# Patient Record
Sex: MALE | Race: WHITE | Employment: OTHER | URBAN - METROPOLITAN AREA
[De-identification: names, ages, dates, MRNs, and addresses within clinical notes are randomized per-mention and may not be internally consistent; named-entity substitution may affect disease eponyms.]

---

## 2017-01-18 PROCEDURE — 82043 UR ALBUMIN QUANTITATIVE: CPT | Performed by: INTERNAL MEDICINE

## 2017-01-18 PROCEDURE — 82570 ASSAY OF URINE CREATININE: CPT | Performed by: INTERNAL MEDICINE

## 2017-04-11 ENCOUNTER — TELEPHONE (OUTPATIENT)
Dept: NEPHROLOGY | Facility: CLINIC | Age: 82
End: 2017-04-11

## 2017-04-12 NOTE — TELEPHONE ENCOUNTER
Left message for pt and and message for daughter about recent labs- stable Cr 3.2 + hgb 10.7- to continue checking labs q3 months- mulugeta bates

## 2017-06-27 RX ORDER — SODIUM BICARBONATE 650 MG/1
650 TABLET ORAL DAILY
Qty: 30 TABLET | Refills: 0 | Status: SHIPPED | OUTPATIENT
Start: 2017-06-27 | End: 2017-08-18

## 2017-08-17 RX ORDER — SODIUM BICARBONATE 650 MG/1
650 TABLET ORAL DAILY
OUTPATIENT
Start: 2017-08-17

## 2017-08-18 RX ORDER — SODIUM BICARBONATE 650 MG/1
650 TABLET ORAL DAILY
Qty: 30 TABLET | Refills: 0 | Status: SHIPPED | OUTPATIENT
Start: 2017-08-18 | End: 2017-09-19

## 2017-08-22 PROBLEM — L60.9 DISEASE OF NAIL: Status: ACTIVE | Noted: 2017-08-22

## 2017-08-28 RX ORDER — FUROSEMIDE 20 MG/1
20 TABLET ORAL DAILY
Qty: 30 TABLET | Refills: 0 | Status: SHIPPED | OUTPATIENT
Start: 2017-08-28 | End: 2017-10-05

## 2017-09-14 ENCOUNTER — OFFICE VISIT (OUTPATIENT)
Dept: NEPHROLOGY | Facility: CLINIC | Age: 82
End: 2017-09-14

## 2017-09-14 VITALS — DIASTOLIC BLOOD PRESSURE: 74 MMHG | BODY MASS INDEX: 29 KG/M2 | SYSTOLIC BLOOD PRESSURE: 122 MMHG | WEIGHT: 193 LBS

## 2017-09-14 DIAGNOSIS — N18.4 CKD (CHRONIC KIDNEY DISEASE) STAGE 4, GFR 15-29 ML/MIN (HCC): Primary | ICD-10-CM

## 2017-09-14 DIAGNOSIS — E87.2 METABOLIC ACIDOSIS: ICD-10-CM

## 2017-09-14 DIAGNOSIS — N18.4 ANEMIA IN STAGE 4 CHRONIC KIDNEY DISEASE (HCC): ICD-10-CM

## 2017-09-14 DIAGNOSIS — D63.1 ANEMIA IN STAGE 4 CHRONIC KIDNEY DISEASE (HCC): ICD-10-CM

## 2017-09-14 PROCEDURE — 99215 OFFICE O/P EST HI 40 MIN: CPT | Performed by: INTERNAL MEDICINE

## 2017-09-14 NOTE — PROGRESS NOTES
Nephrology Progress Note      ASSESSMENT/PLAN:         1) CKD 4- relatively stable renal dysfunction since last visit 6/16 with baseline Cr 3-3.5 mg/dl is likely due to a combination of long-standing HTN / DM + age-related nephrosclerosis; there may be an compared to previously. Has denies any recent hospitalizations or other problems. Meds are otherwise unchanged.     HISTORY:  Past Medical History:   Diagnosis Date   • ANEMIA    • Back problem    • Blind    • DIABETES    • Diabetes ([facility] [address]    • Family hx o Social History: Smoking status: Former Smoker                                                              Packs/day: 0.50      Years: 15.00        Types: Cigarettes     Quit date: 1/1/1970  Smokeless tobacco: Never Used                      Alcohol use: N/V/D  Denies change in urinary habits or gross hematuria  + edema  Denies skin rashes/myalgias/arthralgias      PHYSICAL EXAM:   /74   Wt 193 lb   BMI 29.35 kg/m²   Wt Readings from Last 3 Encounters:  09/14/17 : 193 lb  08/22/17 : 196 lb  07/18/17

## 2017-09-19 RX ORDER — SODIUM BICARBONATE 650 MG/1
650 TABLET ORAL DAILY
Qty: 90 TABLET | Refills: 1 | Status: SHIPPED | OUTPATIENT
Start: 2017-09-19 | End: 2017-11-07

## 2017-09-19 RX ORDER — SODIUM BICARBONATE 650 MG/1
650 TABLET ORAL DAILY
Qty: 90 TABLET | Refills: 1 | Status: SHIPPED | OUTPATIENT
Start: 2017-09-19 | End: 2018-03-08

## 2017-10-05 RX ORDER — FUROSEMIDE 20 MG/1
20 TABLET ORAL DAILY
Qty: 30 TABLET | Refills: 5 | Status: SHIPPED | OUTPATIENT
Start: 2017-10-05 | End: 2018-04-29

## 2017-10-25 PROCEDURE — 82043 UR ALBUMIN QUANTITATIVE: CPT | Performed by: INTERNAL MEDICINE

## 2017-10-25 PROCEDURE — 36415 COLL VENOUS BLD VENIPUNCTURE: CPT | Performed by: INTERNAL MEDICINE

## 2017-10-25 PROCEDURE — 82570 ASSAY OF URINE CREATININE: CPT | Performed by: INTERNAL MEDICINE

## 2018-01-01 ENCOUNTER — HOSPITAL ENCOUNTER (INPATIENT)
Facility: HOSPITAL | Age: 83
LOS: 1 days | Discharge: HOME | DRG: 699 | End: 2018-01-01
Attending: INTERNAL MEDICINE | Admitting: INTERNAL MEDICINE
Payer: MEDICARE

## 2018-01-01 ENCOUNTER — NURSE ONLY (OUTPATIENT)
Dept: NEPHROLOGY | Facility: CLINIC | Age: 83
End: 2018-01-01
Payer: MEDICARE

## 2018-01-01 ENCOUNTER — TELEPHONE (OUTPATIENT)
Dept: NEPHROLOGY | Facility: CLINIC | Age: 83
End: 2018-01-01

## 2018-01-01 ENCOUNTER — APPOINTMENT (OUTPATIENT)
Dept: ULTRASOUND IMAGING | Facility: HOSPITAL | Age: 83
DRG: 699 | End: 2018-01-01
Attending: INTERNAL MEDICINE
Payer: MEDICARE

## 2018-01-01 VITALS — WEIGHT: 202 LBS | SYSTOLIC BLOOD PRESSURE: 148 MMHG | BODY MASS INDEX: 33 KG/M2 | DIASTOLIC BLOOD PRESSURE: 74 MMHG

## 2018-01-01 VITALS
HEART RATE: 87 BPM | WEIGHT: 188.38 LBS | OXYGEN SATURATION: 98 % | DIASTOLIC BLOOD PRESSURE: 67 MMHG | SYSTOLIC BLOOD PRESSURE: 141 MMHG | RESPIRATION RATE: 18 BRPM | TEMPERATURE: 98 F | BODY MASS INDEX: 30 KG/M2

## 2018-01-01 DIAGNOSIS — N18.5 ANEMIA DUE TO STAGE 5 CHRONIC KIDNEY DISEASE, NOT ON CHRONIC DIALYSIS (HCC): ICD-10-CM

## 2018-01-01 DIAGNOSIS — N18.5 CKD (CHRONIC KIDNEY DISEASE) STAGE 5, GFR LESS THAN 15 ML/MIN (HCC): ICD-10-CM

## 2018-01-01 DIAGNOSIS — D63.1 ANEMIA DUE TO STAGE 5 CHRONIC KIDNEY DISEASE, NOT ON CHRONIC DIALYSIS (HCC): ICD-10-CM

## 2018-01-01 PROCEDURE — 96372 THER/PROPH/DIAG INJ SC/IM: CPT | Performed by: INTERNAL MEDICINE

## 2018-01-01 PROCEDURE — 99223 1ST HOSP IP/OBS HIGH 75: CPT | Performed by: INTERNAL MEDICINE

## 2018-01-01 PROCEDURE — 76770 US EXAM ABDO BACK WALL COMP: CPT | Performed by: INTERNAL MEDICINE

## 2018-01-01 PROCEDURE — 99233 SBSQ HOSP IP/OBS HIGH 50: CPT | Performed by: INTERNAL MEDICINE

## 2018-01-01 PROCEDURE — 85025 COMPLETE CBC W/AUTO DIFF WBC: CPT | Performed by: INTERNAL MEDICINE

## 2018-01-01 PROCEDURE — 80048 BASIC METABOLIC PNL TOTAL CA: CPT | Performed by: INTERNAL MEDICINE

## 2018-01-01 RX ORDER — SODIUM BICARBONATE 650 MG/1
650 TABLET ORAL 2 TIMES DAILY
Qty: 60 TABLET | Refills: 11 | Status: ON HOLD | OUTPATIENT
Start: 2018-01-01 | End: 2019-01-01

## 2018-01-01 RX ORDER — GLIMEPIRIDE 2 MG/1
2 TABLET ORAL
Status: DISCONTINUED | OUTPATIENT
Start: 2018-01-01 | End: 2018-01-01

## 2018-01-01 RX ORDER — FUROSEMIDE 20 MG/1
20 TABLET ORAL DAILY
Status: DISCONTINUED | OUTPATIENT
Start: 2018-01-01 | End: 2018-01-01

## 2018-01-01 RX ORDER — FUROSEMIDE 20 MG/1
20 TABLET ORAL DAILY
Qty: 30 TABLET | Refills: 5 | Status: SHIPPED | OUTPATIENT
Start: 2018-01-01 | End: 2019-01-01

## 2018-01-01 RX ORDER — SODIUM BICARBONATE 325 MG/1
650 TABLET ORAL DAILY
Status: DISCONTINUED | OUTPATIENT
Start: 2018-01-01 | End: 2018-01-01

## 2018-01-01 RX ORDER — ALFUZOSIN HYDROCHLORIDE 10 MG/1
10 TABLET, EXTENDED RELEASE ORAL
Status: DISCONTINUED | OUTPATIENT
Start: 2018-01-01 | End: 2018-01-01

## 2018-01-01 RX ORDER — FUROSEMIDE 20 MG/1
20 TABLET ORAL DAILY
Qty: 30 TABLET | Refills: 4 | OUTPATIENT
Start: 2018-01-01

## 2018-01-01 RX ORDER — SODIUM BICARBONATE 325 MG/1
650 TABLET ORAL 2 TIMES DAILY
Status: DISCONTINUED | OUTPATIENT
Start: 2018-01-01 | End: 2018-01-01

## 2018-02-27 PROBLEM — L60.9 DISEASE OF NAIL: Status: ACTIVE | Noted: 2018-02-27

## 2018-03-12 RX ORDER — SODIUM BICARBONATE 650 MG/1
650 TABLET ORAL DAILY
Qty: 90 TABLET | Refills: 1 | Status: SHIPPED | OUTPATIENT
Start: 2018-03-12 | End: 2018-08-23

## 2018-04-30 RX ORDER — FUROSEMIDE 20 MG/1
20 TABLET ORAL DAILY
Qty: 30 TABLET | Refills: 4 | Status: SHIPPED | OUTPATIENT
Start: 2018-04-30 | End: 2018-01-01

## 2018-05-01 ENCOUNTER — TELEPHONE (OUTPATIENT)
Dept: NEPHROLOGY | Facility: CLINIC | Age: 83
End: 2018-05-01

## 2018-08-27 RX ORDER — SODIUM BICARBONATE 650 MG/1
650 TABLET ORAL DAILY
Qty: 30 TABLET | Refills: 0 | Status: SHIPPED | OUTPATIENT
Start: 2018-08-27 | End: 2018-09-27

## 2018-09-12 PROCEDURE — 82570 ASSAY OF URINE CREATININE: CPT | Performed by: INTERNAL MEDICINE

## 2018-09-12 PROCEDURE — 82043 UR ALBUMIN QUANTITATIVE: CPT | Performed by: INTERNAL MEDICINE

## 2018-09-17 ENCOUNTER — TELEPHONE (OUTPATIENT)
Dept: NEPHROLOGY | Facility: CLINIC | Age: 83
End: 2018-09-17

## 2018-09-17 DIAGNOSIS — N18.4 CKD (CHRONIC KIDNEY DISEASE) STAGE 4, GFR 15-29 ML/MIN (HCC): ICD-10-CM

## 2018-09-17 DIAGNOSIS — N17.9 AKI (ACUTE KIDNEY INJURY) (HCC): Primary | ICD-10-CM

## 2018-09-18 ENCOUNTER — TELEPHONE (OUTPATIENT)
Dept: NEPHROLOGY | Facility: CLINIC | Age: 83
End: 2018-09-18

## 2018-09-24 ENCOUNTER — APPOINTMENT (OUTPATIENT)
Dept: LAB | Age: 83
End: 2018-09-24
Attending: INTERNAL MEDICINE
Payer: MEDICARE

## 2018-09-24 DIAGNOSIS — N17.9 AKI (ACUTE KIDNEY INJURY) (HCC): ICD-10-CM

## 2018-09-24 DIAGNOSIS — N18.4 CKD (CHRONIC KIDNEY DISEASE) STAGE 4, GFR 15-29 ML/MIN (HCC): ICD-10-CM

## 2018-09-24 LAB
ANION GAP SERPL CALC-SCNC: 14 MMOL/L (ref 0–18)
BUN BLD-MCNC: 112 MG/DL (ref 8–20)
BUN/CREAT SERPL: 19.2 (ref 10–20)
CALCIUM BLD-MCNC: 7.8 MG/DL (ref 8.3–10.3)
CHLORIDE SERPL-SCNC: 117 MMOL/L (ref 101–111)
CO2 SERPL-SCNC: 15 MMOL/L (ref 22–32)
CREAT BLD-MCNC: 5.82 MG/DL (ref 0.7–1.3)
GLUCOSE BLD-MCNC: 104 MG/DL (ref 70–99)
OSMOLALITY SERPL CALC.SUM OF ELEC: 338 MOSM/KG (ref 275–295)
POTASSIUM SERPL-SCNC: 4.9 MMOL/L (ref 3.6–5.1)
SODIUM SERPL-SCNC: 146 MMOL/L (ref 136–144)

## 2018-09-24 PROCEDURE — 80048 BASIC METABOLIC PNL TOTAL CA: CPT

## 2018-09-26 ENCOUNTER — TELEPHONE (OUTPATIENT)
Dept: CARDIOLOGY UNIT | Facility: HOSPITAL | Age: 83
End: 2018-09-26

## 2018-09-26 NOTE — TELEPHONE ENCOUNTER
D/W daughter- Cr up to 5.8; daughter to see pt this weekend and call me with update on Monday- may need hospitalization- mulugeta bates

## 2018-09-28 RX ORDER — SODIUM BICARBONATE 650 MG/1
650 TABLET ORAL DAILY
Qty: 30 TABLET | Refills: 0 | Status: ON HOLD | OUTPATIENT
Start: 2018-09-28 | End: 2018-01-01

## 2018-10-01 ENCOUNTER — TELEPHONE (OUTPATIENT)
Dept: NEPHROLOGY | Facility: CLINIC | Age: 83
End: 2018-10-01

## 2018-10-01 NOTE — TELEPHONE ENCOUNTER
Daughter calling to inform you of fathers condition. Patient is alert, swelling in legs seems about the same (slightly larger). Patient has pink/red patch (6x3) on right shin, pt has a harder time getting around and is taking more naps.

## 2018-10-03 NOTE — PROGRESS NOTES
NURSING ADMISSION NOTE      Patient admitted via Wheelchair  Oriented to room. Safety precautions initiated. Bed in low position. Call light in reach. Received pt as a direct admit. Pt alert and oriented x4. Legally blind. [place]. VSS.  Maintaining 0

## 2018-10-03 NOTE — PROGRESS NOTES
Pharmacy Note: Dietary Supplement Discontinuation Per Policy    Cod Liver oil has been discontinued on Idalmis Nick per policy. This supplement may be restarted upon discharge using the medication reconciliation process.     Thank you,   Tu Chapman

## 2018-10-03 NOTE — CONSULTS
[address] Patient Status:  Inpatient    1923 MRN [id number]   [facility] 5NW-A Attending Gerald Oconnell MD   [facility] Day # 0 PCP Adi Rowell MD       Assessment / Plan:    1) CKD 4/5- serum creatin [facility], DR. Arce Section   • Muscle weakness    • OSTEOPENIA    • Renal disorder    • Visual impairment      Past Surgical History:  8/22/2013: HIP HEMIARTHROPLASTY/ BIPOLAR;  Left      Comment:  Performed by Oly Limon MD at [address] [place] [address] OR  5/11/2016: [facility]  Family History   Problem Relation Age of Onset   • Cancer Father    • Cancer Mother    • Cancer Brother    • Heart Disorder Brother       reports that he quit smoking about 48 years ago. His smoking use included cigarettes.  He h

## 2018-10-04 NOTE — PROGRESS NOTES
[facility]  Nephrology Progress Note    Shlomo Denton Attending:  Akilah Parekh MD       Assessment and Plan:    1) CKD 5- progressive renal failure due to DM / HTN / nephrosclerosis +/- glomerulopathy- urine studies / US noted- no reversible pro ALB 3.3 10/03/2018    ALKPHO 83 10/03/2018    BILT 0.5 10/03/2018    TP 7.4 10/03/2018    AST 9 10/03/2018    ALT 13 10/03/2018    PHOS 5.5 10/03/2018    PGLU 86 10/04/2018       Imaging: All imaging studies reviewed.     Meds:     Current Facility-[facility]

## 2018-10-04 NOTE — H&P
[address] Patient Status:  Inpatient    1923 MRN [id number]   [facility] 5NW-A Attending Rosa Macedo MD   [address] Day # 0 PCP John Ball MD         Assessment / Plan:     1) CKD 4/5- ser impairment     • Macular degeneration 6/8/2010     ELEVATED IOP, DR. Reena Haji   • Muscle weakness     • OSTEOPENIA     • Renal disorder     • Visual impairment           Past Surgical History:  8/22/2013: HIP HEMIARTHROPLASTY/ BIPOLAR;  Left      Comment: EPIDURAL;  Surgeon: Juan Chisholm MD;  Location: [facility]        Family History   Problem Relation Age of Onset   • Cancer Father     • Cancer Mother     • Cancer Brother     • Heart Disorder Brother         reports t

## 2018-10-04 NOTE — PLAN OF CARE
Problem: Patient/Family Goals  Goal: Patient/Family Short Term Goal  Patient's Short Term Goal: Improved Kidney function  10/3(noc): to sleep  10/4-resolve weakness and jessica    Interventions:   10/4-up as tolerated  - cluster care, keep room dark/quiet  - N

## 2018-10-04 NOTE — PROGRESS NOTES
NURSING DISCHARGE NOTE    Discharged Home via Wheelchair. Accompanied by Family member and Support staff  Belongings Taken by patient/family. Pt discharged to home. discharge instruction given to pt and daughter. both verbalized understanding. PIV remov

## 2018-10-04 NOTE — PLAN OF CARE
Diabetes/Glucose Control    • Glucose maintained within prescribed range Progressing        METABOLIC/FLUID AND ELECTROLYTES - ADULT    • Hemodynamic stability and optimal renal function maintained Progressing        MUSCULOSKELETAL - ADULT    • Return mob

## 2018-10-04 NOTE — CM/SW NOTE
Met with pt who is a 79 y/o man who lives alone at the [facility] in [facility]. He is legally blind, but normally independent with ADLs - cooking by microwave, shopping and doing his own laundry.   He has a person who comes over once weekly to week 1 day/week

## 2018-10-04 NOTE — PROGRESS NOTES
Pt is alert oriented. [place] and legally blind. walks in the hallway with walker with supervision. denies pain,fever,sob or N/V.discharge today at 1430

## 2018-10-05 NOTE — CM/SW NOTE
Patient discharged on 10/4/18 with no further needs identified.        10/05/18 0800   Discharge disposition   Expected discharge disposition Home or Self   Name of CharlieSyed Benitez services after discharge None;Patient refused servic

## 2018-11-28 NOTE — TELEPHONE ENCOUNTER
D/w daughter- she is to decide whether pt will start monthly EPO; renal function slowly declining but pt has elected not to start HD- thx paulo

## 2019-01-01 ENCOUNTER — APPOINTMENT (OUTPATIENT)
Dept: CT IMAGING | Facility: HOSPITAL | Age: 84
DRG: 683 | End: 2019-01-01
Attending: EMERGENCY MEDICINE
Payer: MEDICARE

## 2019-01-01 ENCOUNTER — HOSPITAL ENCOUNTER (INPATIENT)
Facility: HOSPITAL | Age: 84
LOS: 4 days | Discharge: NURSING, REHAB OR LONG-TERM CARE | DRG: 683 | End: 2019-01-01
Attending: EMERGENCY MEDICINE | Admitting: INTERNAL MEDICINE
Payer: MEDICARE

## 2019-01-01 ENCOUNTER — NURSE ONLY (OUTPATIENT)
Dept: NEPHROLOGY | Facility: CLINIC | Age: 84
End: 2019-01-01
Payer: MEDICARE

## 2019-01-01 ENCOUNTER — TELEPHONE (OUTPATIENT)
Dept: FAMILY MEDICINE CLINIC | Facility: CLINIC | Age: 84
End: 2019-01-01

## 2019-01-01 ENCOUNTER — TELEPHONE (OUTPATIENT)
Dept: NEPHROLOGY | Facility: CLINIC | Age: 84
End: 2019-01-01

## 2019-01-01 ENCOUNTER — INITIAL APN SNF VISIT (OUTPATIENT)
Dept: INTERNAL MEDICINE CLINIC | Age: 84
End: 2019-01-01

## 2019-01-01 ENCOUNTER — APPOINTMENT (OUTPATIENT)
Dept: GENERAL RADIOLOGY | Facility: HOSPITAL | Age: 84
DRG: 683 | End: 2019-01-01
Attending: EMERGENCY MEDICINE
Payer: MEDICARE

## 2019-01-01 ENCOUNTER — SNF VISIT (OUTPATIENT)
Dept: INTERNAL MEDICINE CLINIC | Age: 84
End: 2019-01-01

## 2019-01-01 ENCOUNTER — EXTERNAL FACILITY (OUTPATIENT)
Dept: FAMILY MEDICINE CLINIC | Facility: CLINIC | Age: 84
End: 2019-01-01

## 2019-01-01 ENCOUNTER — SNF DISCHARGE (OUTPATIENT)
Dept: INTERNAL MEDICINE CLINIC | Age: 84
End: 2019-01-01

## 2019-01-01 ENCOUNTER — HOSPITAL ENCOUNTER (INPATIENT)
Facility: HOSPITAL | Age: 84
LOS: 1 days | Discharge: NURSING, REHAB OR LONG-TERM CARE | DRG: 683 | End: 2019-01-01
Attending: EMERGENCY MEDICINE | Admitting: HOSPITALIST
Payer: MEDICARE

## 2019-01-01 ENCOUNTER — LAB ENCOUNTER (OUTPATIENT)
Dept: LAB | Age: 84
End: 2019-01-01
Attending: INTERNAL MEDICINE
Payer: MEDICARE

## 2019-01-01 VITALS
TEMPERATURE: 98 F | BODY MASS INDEX: 31.58 KG/M2 | OXYGEN SATURATION: 91 % | DIASTOLIC BLOOD PRESSURE: 56 MMHG | HEIGHT: 69 IN | SYSTOLIC BLOOD PRESSURE: 107 MMHG | RESPIRATION RATE: 16 BRPM | HEART RATE: 67 BPM | WEIGHT: 213.19 LBS

## 2019-01-01 VITALS
OXYGEN SATURATION: 96 % | WEIGHT: 197.63 LBS | SYSTOLIC BLOOD PRESSURE: 130 MMHG | RESPIRATION RATE: 16 BRPM | BODY MASS INDEX: 29 KG/M2 | DIASTOLIC BLOOD PRESSURE: 65 MMHG | TEMPERATURE: 98 F | HEART RATE: 82 BPM

## 2019-01-01 VITALS — BODY MASS INDEX: 32 KG/M2 | SYSTOLIC BLOOD PRESSURE: 158 MMHG | DIASTOLIC BLOOD PRESSURE: 84 MMHG | WEIGHT: 198 LBS

## 2019-01-01 VITALS
OXYGEN SATURATION: 95 % | HEART RATE: 72 BPM | BODY MASS INDEX: 29 KG/M2 | SYSTOLIC BLOOD PRESSURE: 126 MMHG | TEMPERATURE: 97 F | WEIGHT: 197.81 LBS | DIASTOLIC BLOOD PRESSURE: 66 MMHG | RESPIRATION RATE: 18 BRPM

## 2019-01-01 VITALS — WEIGHT: 195 LBS | DIASTOLIC BLOOD PRESSURE: 64 MMHG | SYSTOLIC BLOOD PRESSURE: 148 MMHG | BODY MASS INDEX: 31 KG/M2

## 2019-01-01 VITALS — WEIGHT: 195 LBS | SYSTOLIC BLOOD PRESSURE: 146 MMHG | BODY MASS INDEX: 31 KG/M2 | DIASTOLIC BLOOD PRESSURE: 74 MMHG

## 2019-01-01 VITALS
DIASTOLIC BLOOD PRESSURE: 70 MMHG | HEART RATE: 80 BPM | RESPIRATION RATE: 18 BRPM | BODY MASS INDEX: 30 KG/M2 | TEMPERATURE: 98 F | WEIGHT: 201 LBS | SYSTOLIC BLOOD PRESSURE: 135 MMHG

## 2019-01-01 VITALS
WEIGHT: 192.63 LBS | TEMPERATURE: 98 F | RESPIRATION RATE: 16 BRPM | BODY MASS INDEX: 28.53 KG/M2 | HEART RATE: 78 BPM | HEIGHT: 69 IN | OXYGEN SATURATION: 99 % | DIASTOLIC BLOOD PRESSURE: 69 MMHG | SYSTOLIC BLOOD PRESSURE: 144 MMHG

## 2019-01-01 DIAGNOSIS — D63.1 ANEMIA OF CHRONIC RENAL FAILURE, STAGE 5 (HCC): ICD-10-CM

## 2019-01-01 DIAGNOSIS — N18.5 CKD (CHRONIC KIDNEY DISEASE) STAGE 5, GFR LESS THAN 15 ML/MIN (HCC): Primary | ICD-10-CM

## 2019-01-01 DIAGNOSIS — G89.29 CHRONIC MIDLINE LOW BACK PAIN, UNSPECIFIED WHETHER SCIATICA PRESENT: Primary | ICD-10-CM

## 2019-01-01 DIAGNOSIS — N18.5 CKD (CHRONIC KIDNEY DISEASE) STAGE 5, GFR LESS THAN 15 ML/MIN (HCC): ICD-10-CM

## 2019-01-01 DIAGNOSIS — E87.5 HYPERKALEMIA: ICD-10-CM

## 2019-01-01 DIAGNOSIS — M54.41 ACUTE RIGHT-SIDED LOW BACK PAIN WITH BILATERAL SCIATICA: ICD-10-CM

## 2019-01-01 DIAGNOSIS — M62.81 MUSCLE WEAKNESS: ICD-10-CM

## 2019-01-01 DIAGNOSIS — E11.8 DIABETES MELLITUS WITH MULTIPLE COMPLICATIONS (HCC): ICD-10-CM

## 2019-01-01 DIAGNOSIS — M50.30 DDD (DEGENERATIVE DISC DISEASE), CERVICAL: ICD-10-CM

## 2019-01-01 DIAGNOSIS — M54.50 CHRONIC MIDLINE LOW BACK PAIN, UNSPECIFIED WHETHER SCIATICA PRESENT: Primary | ICD-10-CM

## 2019-01-01 DIAGNOSIS — N18.5 CKD (CHRONIC KIDNEY DISEASE), SYMPTOM MANAGEMENT ONLY, STAGE 5 (HCC): ICD-10-CM

## 2019-01-01 DIAGNOSIS — D63.1 ANEMIA DUE TO STAGE 5 CHRONIC KIDNEY DISEASE, NOT ON CHRONIC DIALYSIS (HCC): ICD-10-CM

## 2019-01-01 DIAGNOSIS — N18.5 ANEMIA DUE TO STAGE 5 CHRONIC KIDNEY DISEASE, NOT ON CHRONIC DIALYSIS (HCC): ICD-10-CM

## 2019-01-01 DIAGNOSIS — M54.42 ACUTE RIGHT-SIDED LOW BACK PAIN WITH BILATERAL SCIATICA: ICD-10-CM

## 2019-01-01 DIAGNOSIS — N18.5 ANEMIA OF CHRONIC RENAL FAILURE, STAGE 5 (HCC): ICD-10-CM

## 2019-01-01 DIAGNOSIS — I10 ESSENTIAL HYPERTENSION: ICD-10-CM

## 2019-01-01 DIAGNOSIS — N18.4 CKD (CHRONIC KIDNEY DISEASE) STAGE 4, GFR 15-29 ML/MIN (HCC): ICD-10-CM

## 2019-01-01 DIAGNOSIS — M48.061 SPINAL STENOSIS OF LUMBAR REGION WITHOUT NEUROGENIC CLAUDICATION: ICD-10-CM

## 2019-01-01 DIAGNOSIS — M54.42 CHRONIC LEFT-SIDED LOW BACK PAIN WITH LEFT-SIDED SCIATICA: Primary | ICD-10-CM

## 2019-01-01 DIAGNOSIS — E55.9 VITAMIN D DEFICIENCY: ICD-10-CM

## 2019-01-01 DIAGNOSIS — H35.30 MACULAR DEGENERATION OF BOTH EYES, UNSPECIFIED TYPE: ICD-10-CM

## 2019-01-01 DIAGNOSIS — N18.5 CHRONIC KIDNEY DISEASE, STAGE V (HCC): Primary | ICD-10-CM

## 2019-01-01 DIAGNOSIS — N40.0 BENIGN PROSTATIC HYPERPLASIA, UNSPECIFIED WHETHER LOWER URINARY TRACT SYMPTOMS PRESENT: ICD-10-CM

## 2019-01-01 DIAGNOSIS — N17.9 ACUTE RENAL FAILURE, UNSPECIFIED ACUTE RENAL FAILURE TYPE (HCC): Primary | ICD-10-CM

## 2019-01-01 DIAGNOSIS — G89.29 CHRONIC LEFT-SIDED LOW BACK PAIN WITH LEFT-SIDED SCIATICA: Primary | ICD-10-CM

## 2019-01-01 DIAGNOSIS — N18.5 CKD (CHRONIC KIDNEY DISEASE), STAGE V (HCC): ICD-10-CM

## 2019-01-01 DIAGNOSIS — D50.8 OTHER IRON DEFICIENCY ANEMIA: ICD-10-CM

## 2019-01-01 DIAGNOSIS — N19 RENAL FAILURE, UNSPECIFIED CHRONICITY: ICD-10-CM

## 2019-01-01 DIAGNOSIS — M48.061 SPINAL STENOSIS OF LUMBAR REGION, UNSPECIFIED WHETHER NEUROGENIC CLAUDICATION PRESENT: ICD-10-CM

## 2019-01-01 DIAGNOSIS — E87.2 METABOLIC ACIDOSIS: ICD-10-CM

## 2019-01-01 DIAGNOSIS — E87.0 HYPERNATREMIA: ICD-10-CM

## 2019-01-01 DIAGNOSIS — R60.0 EDEMA OF BOTH LEGS: ICD-10-CM

## 2019-01-01 DIAGNOSIS — M54.9 BACK PAIN WITHOUT RADIATION: Primary | ICD-10-CM

## 2019-01-01 LAB
ALBUMIN SERPL-MCNC: 3 G/DL (ref 3.4–5)
ALBUMIN SERPL-MCNC: 3.3 G/DL (ref 3.4–5)
ALBUMIN/GLOB SERPL: 0.9 {RATIO} (ref 1–2)
ALBUMIN/GLOB SERPL: 0.9 {RATIO} (ref 1–2)
ALP LIVER SERPL-CCNC: 66 U/L (ref 45–117)
ALP LIVER SERPL-CCNC: 79 U/L (ref 45–117)
ALT SERPL-CCNC: 15 U/L (ref 16–61)
ALT SERPL-CCNC: 9 U/L (ref 16–61)
ANION GAP SERPL CALC-SCNC: 11 MMOL/L (ref 0–18)
ANION GAP SERPL CALC-SCNC: 12 MMOL/L (ref 0–18)
ANTIBODY SCREEN: NEGATIVE
APTT PPP: 28.2 SECONDS (ref 25.4–36.1)
APTT PPP: 30.3 SECONDS (ref 25.4–36.1)
AST SERPL-CCNC: 6 U/L (ref 15–37)
AST SERPL-CCNC: 9 U/L (ref 15–37)
ATRIAL RATE: 70 BPM
BASOPHILS # BLD AUTO: 0.02 X10(3) UL (ref 0–0.2)
BASOPHILS # BLD AUTO: 0.03 X10(3) UL (ref 0–0.2)
BASOPHILS NFR BLD AUTO: 0.4 %
BASOPHILS NFR BLD AUTO: 0.5 %
BILIRUB SERPL-MCNC: 0.4 MG/DL (ref 0.1–2)
BILIRUB SERPL-MCNC: 0.4 MG/DL (ref 0.1–2)
BILIRUB UR QL STRIP.AUTO: NEGATIVE
BILIRUB UR QL STRIP.AUTO: NEGATIVE
BUN BLD-MCNC: 107 MG/DL (ref 7–18)
BUN BLD-MCNC: 111 MG/DL (ref 7–18)
BUN BLD-MCNC: 119 MG/DL (ref 7–18)
BUN BLD-MCNC: 120 MG/DL (ref 7–18)
BUN BLD-MCNC: 130 MG/DL (ref 7–18)
BUN/CREAT SERPL: 12.7 (ref 10–20)
BUN/CREAT SERPL: 13 (ref 10–20)
BUN/CREAT SERPL: 17.6 (ref 10–20)
BUN/CREAT SERPL: 17.9 (ref 10–20)
BUN/CREAT SERPL: 18.5 (ref 10–20)
CALCIUM BLD-MCNC: 7.6 MG/DL (ref 8.5–10.1)
CALCIUM BLD-MCNC: 7.8 MG/DL (ref 8.5–10.1)
CALCIUM BLD-MCNC: 7.9 MG/DL (ref 8.5–10.1)
CALCIUM BLD-MCNC: 8 MG/DL (ref 8.5–10.1)
CALCIUM BLD-MCNC: 8.3 MG/DL (ref 8.5–10.1)
CHLORIDE SERPL-SCNC: 112 MMOL/L (ref 98–112)
CHLORIDE SERPL-SCNC: 113 MMOL/L (ref 98–112)
CHLORIDE SERPL-SCNC: 115 MMOL/L (ref 98–112)
CHLORIDE SERPL-SCNC: 116 MMOL/L (ref 98–112)
CHLORIDE SERPL-SCNC: 116 MMOL/L (ref 98–112)
CLARITY UR REFRACT.AUTO: CLEAR
CLARITY UR REFRACT.AUTO: CLEAR
CO2 SERPL-SCNC: 18 MMOL/L (ref 21–32)
CO2 SERPL-SCNC: 20 MMOL/L (ref 21–32)
CO2 SERPL-SCNC: 22 MMOL/L (ref 21–32)
CREAT BLD-MCNC: 6.65 MG/DL (ref 0.7–1.3)
CREAT BLD-MCNC: 6.83 MG/DL (ref 0.7–1.3)
CREAT BLD-MCNC: 7.04 MG/DL (ref 0.7–1.3)
CREAT BLD-MCNC: 8.25 MG/DL (ref 0.7–1.3)
CREAT BLD-MCNC: 8.72 MG/DL (ref 0.7–1.3)
DEPRECATED HBV CORE AB SER IA-ACNC: 110.7 NG/ML (ref 30–530)
DEPRECATED RDW RBC AUTO: 49.8 FL (ref 35.1–46.3)
DEPRECATED RDW RBC AUTO: 51.1 FL (ref 35.1–46.3)
DEPRECATED RDW RBC AUTO: 52 FL (ref 35.1–46.3)
DEPRECATED RDW RBC AUTO: 61.9 FL (ref 35.1–46.3)
EOSINOPHIL # BLD AUTO: 0.17 X10(3) UL (ref 0–0.7)
EOSINOPHIL # BLD AUTO: 0.18 X10(3) UL (ref 0–0.7)
EOSINOPHIL NFR BLD AUTO: 2.5 %
EOSINOPHIL NFR BLD AUTO: 4.7 %
ERYTHROCYTE [DISTWIDTH] IN BLOOD BY AUTOMATED COUNT: 13.2 % (ref 11–15)
ERYTHROCYTE [DISTWIDTH] IN BLOOD BY AUTOMATED COUNT: 13.2 % (ref 11–15)
ERYTHROCYTE [DISTWIDTH] IN BLOOD BY AUTOMATED COUNT: 13.5 % (ref 11–15)
ERYTHROCYTE [DISTWIDTH] IN BLOOD BY AUTOMATED COUNT: 15.8 % (ref 11–15)
GLOBULIN PLAS-MCNC: 3.3 G/DL (ref 2.8–4.4)
GLOBULIN PLAS-MCNC: 3.7 G/DL (ref 2.8–4.4)
GLUCOSE BLD-MCNC: 102 MG/DL (ref 70–99)
GLUCOSE BLD-MCNC: 111 MG/DL (ref 70–99)
GLUCOSE BLD-MCNC: 115 MG/DL (ref 70–99)
GLUCOSE BLD-MCNC: 130 MG/DL (ref 70–99)
GLUCOSE BLD-MCNC: 132 MG/DL (ref 70–99)
GLUCOSE BLD-MCNC: 139 MG/DL (ref 70–99)
GLUCOSE BLD-MCNC: 158 MG/DL (ref 70–99)
GLUCOSE BLD-MCNC: 169 MG/DL (ref 70–99)
GLUCOSE BLD-MCNC: 198 MG/DL (ref 70–99)
GLUCOSE BLD-MCNC: 206 MG/DL (ref 70–99)
GLUCOSE BLD-MCNC: 209 MG/DL (ref 70–99)
GLUCOSE BLD-MCNC: 209 MG/DL (ref 70–99)
GLUCOSE BLD-MCNC: 210 MG/DL (ref 70–99)
GLUCOSE BLD-MCNC: 233 MG/DL (ref 70–99)
GLUCOSE BLD-MCNC: 234 MG/DL (ref 70–99)
GLUCOSE BLD-MCNC: 286 MG/DL (ref 70–99)
GLUCOSE BLD-MCNC: 299 MG/DL (ref 70–99)
GLUCOSE BLD-MCNC: 85 MG/DL (ref 70–99)
GLUCOSE BLD-MCNC: 87 MG/DL (ref 70–99)
GLUCOSE BLD-MCNC: 92 MG/DL (ref 70–99)
GLUCOSE BLD-MCNC: 96 MG/DL (ref 70–99)
GLUCOSE BLD-MCNC: 97 MG/DL (ref 70–99)
GLUCOSE UR STRIP.AUTO-MCNC: 50 MG/DL
GLUCOSE UR STRIP.AUTO-MCNC: 50 MG/DL
HAV IGM SER QL: 2.6 MG/DL (ref 1.6–2.6)
HCT VFR BLD AUTO: 25.5 % (ref 39–53)
HCT VFR BLD AUTO: 25.6 % (ref 39–53)
HCT VFR BLD AUTO: 26.4 % (ref 39–53)
HCT VFR BLD AUTO: 29.1 % (ref 39–53)
HGB BLD-MCNC: 8 G/DL (ref 13–17.5)
HGB BLD-MCNC: 8.4 G/DL (ref 13–17.5)
HGB BLD-MCNC: 8.4 G/DL (ref 13–17.5)
HGB BLD-MCNC: 9.2 G/DL (ref 13–17.5)
IMM GRANULOCYTES # BLD AUTO: 0.02 X10(3) UL (ref 0–1)
IMM GRANULOCYTES # BLD AUTO: 0.06 X10(3) UL (ref 0–1)
IMM GRANULOCYTES NFR BLD: 0.5 %
IMM GRANULOCYTES NFR BLD: 0.9 %
INR BLD: 1.14 (ref 0.9–1.1)
INR BLD: 1.2 (ref 0.9–1.1)
IRON SATURATION: 25 % (ref 20–50)
IRON SERPL-MCNC: 60 UG/DL (ref 65–175)
KETONES UR STRIP.AUTO-MCNC: NEGATIVE MG/DL
KETONES UR STRIP.AUTO-MCNC: NEGATIVE MG/DL
LEUKOCYTE ESTERASE UR QL STRIP.AUTO: NEGATIVE
LEUKOCYTE ESTERASE UR QL STRIP.AUTO: NEGATIVE
LIPASE SERPL-CCNC: 604 U/L (ref 73–393)
LYMPHOCYTES # BLD AUTO: 0.62 X10(3) UL (ref 1–4)
LYMPHOCYTES # BLD AUTO: 0.71 X10(3) UL (ref 1–4)
LYMPHOCYTES NFR BLD AUTO: 10.3 %
LYMPHOCYTES NFR BLD AUTO: 16.3 %
M PROTEIN MFR SERPL ELPH: 6.3 G/DL (ref 6.4–8.2)
M PROTEIN MFR SERPL ELPH: 7 G/DL (ref 6.4–8.2)
MCH RBC QN AUTO: 33.1 PG (ref 26–34)
MCH RBC QN AUTO: 33.2 PG (ref 26–34)
MCH RBC QN AUTO: 33.5 PG (ref 26–34)
MCH RBC QN AUTO: 33.9 PG (ref 26–34)
MCHC RBC AUTO-ENTMCNC: 31.3 G/DL (ref 31–37)
MCHC RBC AUTO-ENTMCNC: 31.6 G/DL (ref 31–37)
MCHC RBC AUTO-ENTMCNC: 31.8 G/DL (ref 31–37)
MCHC RBC AUTO-ENTMCNC: 32.9 G/DL (ref 31–37)
MCV RBC AUTO: 102.8 FL (ref 80–100)
MCV RBC AUTO: 103.9 FL (ref 80–100)
MCV RBC AUTO: 105.1 FL (ref 80–100)
MCV RBC AUTO: 107.1 FL (ref 80–100)
MONOCYTES # BLD AUTO: 0.63 X10(3) UL (ref 0.1–1)
MONOCYTES # BLD AUTO: 0.73 X10(3) UL (ref 0.1–1)
MONOCYTES NFR BLD AUTO: 10.6 %
MONOCYTES NFR BLD AUTO: 16.5 %
NEUTROPHILS # BLD AUTO: 2.34 X10 (3) UL (ref 1.5–7.7)
NEUTROPHILS # BLD AUTO: 2.34 X10(3) UL (ref 1.5–7.7)
NEUTROPHILS # BLD AUTO: 5.16 X10 (3) UL (ref 1.5–7.7)
NEUTROPHILS # BLD AUTO: 5.16 X10(3) UL (ref 1.5–7.7)
NEUTROPHILS NFR BLD AUTO: 61.5 %
NEUTROPHILS NFR BLD AUTO: 75.3 %
NITRITE UR QL STRIP.AUTO: NEGATIVE
NITRITE UR QL STRIP.AUTO: NEGATIVE
OSMOLALITY SERPL CALC.SUM OF ELEC: 336 MOSM/KG (ref 275–295)
OSMOLALITY SERPL CALC.SUM OF ELEC: 336 MOSM/KG (ref 275–295)
OSMOLALITY SERPL CALC.SUM OF ELEC: 338 MOSM/KG (ref 275–295)
OSMOLALITY SERPL CALC.SUM OF ELEC: 340 MOSM/KG (ref 275–295)
OSMOLALITY SERPL CALC.SUM OF ELEC: 344 MOSM/KG (ref 275–295)
P AXIS: 65 DEGREES
P-R INTERVAL: 206 MS
PH UR STRIP.AUTO: 6 [PH] (ref 4.5–8)
PH UR STRIP.AUTO: 7 [PH] (ref 4.5–8)
PHOSPHATE SERPL-MCNC: 5.5 MG/DL (ref 2.5–4.9)
PLATELET # BLD AUTO: 115 10(3)UL (ref 150–450)
PLATELET # BLD AUTO: 121 10(3)UL (ref 150–450)
PLATELET # BLD AUTO: 129 10(3)UL (ref 150–450)
PLATELET # BLD AUTO: 133 10(3)UL (ref 150–450)
POTASSIUM SERPL-SCNC: 4.6 MMOL/L (ref 3.5–5.1)
POTASSIUM SERPL-SCNC: 4.6 MMOL/L (ref 3.5–5.1)
POTASSIUM SERPL-SCNC: 5.1 MMOL/L (ref 3.5–5.1)
POTASSIUM SERPL-SCNC: 5.8 MMOL/L (ref 3.5–5.1)
POTASSIUM SERPL-SCNC: 6.1 MMOL/L (ref 3.5–5.1)
PROT UR STRIP.AUTO-MCNC: 100 MG/DL
PROT UR STRIP.AUTO-MCNC: 100 MG/DL
PSA SERPL DL<=0.01 NG/ML-MCNC: 15.1 SECONDS (ref 12.5–14.7)
PSA SERPL DL<=0.01 NG/ML-MCNC: 15.8 SECONDS (ref 12.5–14.7)
Q-T INTERVAL: 418 MS
QRS DURATION: 70 MS
QTC CALCULATION (BEZET): 451 MS
R AXIS: 14 DEGREES
RBC # BLD AUTO: 2.39 X10(6)UL (ref 3.8–5.8)
RBC # BLD AUTO: 2.48 X10(6)UL (ref 3.8–5.8)
RBC # BLD AUTO: 2.54 X10(6)UL (ref 3.8–5.8)
RBC # BLD AUTO: 2.77 X10(6)UL (ref 3.8–5.8)
RBC UR QL AUTO: NEGATIVE
RBC UR QL AUTO: NEGATIVE
RH BLOOD TYPE: POSITIVE
SODIUM SERPL-SCNC: 144 MMOL/L (ref 136–145)
SODIUM SERPL-SCNC: 147 MMOL/L (ref 136–145)
SODIUM SERPL-SCNC: 149 MMOL/L (ref 136–145)
SP GR UR STRIP.AUTO: 1.01 (ref 1–1.03)
SP GR UR STRIP.AUTO: 1.01 (ref 1–1.03)
T AXIS: 26 DEGREES
TOTAL IRON BINDING CAPACITY: 244 UG/DL (ref 240–450)
TRANSFERRIN SERPL-MCNC: 164 MG/DL (ref 200–360)
TROPONIN I SERPL-MCNC: <0.045 NG/ML (ref ?–0.04)
UROBILINOGEN UR STRIP.AUTO-MCNC: <2 MG/DL
UROBILINOGEN UR STRIP.AUTO-MCNC: <2 MG/DL
VENTRICULAR RATE: 70 BPM
WBC # BLD AUTO: 3.8 X10(3) UL (ref 4–11)
WBC # BLD AUTO: 4.5 X10(3) UL (ref 4–11)
WBC # BLD AUTO: 4.8 X10(3) UL (ref 4–11)
WBC # BLD AUTO: 6.9 X10(3) UL (ref 4–11)

## 2019-01-01 PROCEDURE — 70450 CT HEAD/BRAIN W/O DYE: CPT | Performed by: EMERGENCY MEDICINE

## 2019-01-01 PROCEDURE — 99305 1ST NF CARE MODERATE MDM 35: CPT | Performed by: NURSE PRACTITIONER

## 2019-01-01 PROCEDURE — 99305 1ST NF CARE MODERATE MDM 35: CPT | Performed by: FAMILY MEDICINE

## 2019-01-01 PROCEDURE — 99223 1ST HOSP IP/OBS HIGH 75: CPT | Performed by: INTERNAL MEDICINE

## 2019-01-01 PROCEDURE — 80048 BASIC METABOLIC PNL TOTAL CA: CPT | Performed by: INTERNAL MEDICINE

## 2019-01-01 PROCEDURE — 99239 HOSP IP/OBS DSCHRG MGMT >30: CPT | Performed by: HOSPITALIST

## 2019-01-01 PROCEDURE — 80048 BASIC METABOLIC PNL TOTAL CA: CPT

## 2019-01-01 PROCEDURE — 99316 NF DSCHRG MGMT 30 MIN+: CPT | Performed by: NURSE PRACTITIONER

## 2019-01-01 PROCEDURE — 99233 SBSQ HOSP IP/OBS HIGH 50: CPT | Performed by: INTERNAL MEDICINE

## 2019-01-01 PROCEDURE — 85025 COMPLETE CBC W/AUTO DIFF WBC: CPT | Performed by: INTERNAL MEDICINE

## 2019-01-01 PROCEDURE — 72131 CT LUMBAR SPINE W/O DYE: CPT | Performed by: EMERGENCY MEDICINE

## 2019-01-01 PROCEDURE — 99306 1ST NF CARE HIGH MDM 50: CPT | Performed by: FAMILY MEDICINE

## 2019-01-01 PROCEDURE — 99307 SBSQ NF CARE SF MDM 10: CPT | Performed by: NURSE PRACTITIONER

## 2019-01-01 PROCEDURE — 96372 THER/PROPH/DIAG INJ SC/IM: CPT | Performed by: INTERNAL MEDICINE

## 2019-01-01 PROCEDURE — 85025 COMPLETE CBC W/AUTO DIFF WBC: CPT

## 2019-01-01 PROCEDURE — 71045 X-RAY EXAM CHEST 1 VIEW: CPT | Performed by: EMERGENCY MEDICINE

## 2019-01-01 RX ORDER — GABAPENTIN 100 MG/1
100 CAPSULE ORAL 2 TIMES DAILY
Qty: 60 CAPSULE | Refills: 0 | Status: SHIPPED | OUTPATIENT
Start: 2019-01-01

## 2019-01-01 RX ORDER — SODIUM POLYSTYRENE SULFONATE 4.1 MEQ/G
15 POWDER, FOR SUSPENSION ORAL; RECTAL ONCE
COMMUNITY
Start: 2019-01-01 | End: 2019-01-01

## 2019-01-01 RX ORDER — ONDANSETRON 2 MG/ML
4 INJECTION INTRAMUSCULAR; INTRAVENOUS EVERY 6 HOURS PRN
Status: DISCONTINUED | OUTPATIENT
Start: 2019-01-01 | End: 2019-01-01

## 2019-01-01 RX ORDER — SODIUM BICARBONATE 325 MG/1
650 TABLET ORAL 2 TIMES DAILY
Status: DISCONTINUED | OUTPATIENT
Start: 2019-01-01 | End: 2019-01-01

## 2019-01-01 RX ORDER — HYDROCODONE BITARTRATE AND ACETAMINOPHEN 5; 325 MG/1; MG/1
1 TABLET ORAL EVERY 4 HOURS PRN
Status: DISCONTINUED | OUTPATIENT
Start: 2019-01-01 | End: 2019-01-01

## 2019-01-01 RX ORDER — ACETAMINOPHEN 325 MG/1
650 TABLET ORAL EVERY 6 HOURS PRN
Qty: 30 TABLET | Refills: 0 | Status: SHIPPED | OUTPATIENT
Start: 2019-01-01

## 2019-01-01 RX ORDER — SODIUM CHLORIDE 9 MG/ML
INJECTION, SOLUTION INTRAVENOUS CONTINUOUS
Status: DISCONTINUED | OUTPATIENT
Start: 2019-01-01 | End: 2019-01-01

## 2019-01-01 RX ORDER — POLYETHYLENE GLYCOL 3350 17 G/17G
17 POWDER, FOR SOLUTION ORAL DAILY PRN
Status: DISCONTINUED | OUTPATIENT
Start: 2019-01-01 | End: 2019-01-01

## 2019-01-01 RX ORDER — DEXTROSE MONOHYDRATE 25 G/50ML
50 INJECTION, SOLUTION INTRAVENOUS ONCE
Status: COMPLETED | OUTPATIENT
Start: 2019-01-01 | End: 2019-01-01

## 2019-01-01 RX ORDER — SODIUM BICARBONATE 325 MG/1
1300 TABLET ORAL 2 TIMES DAILY
Status: DISCONTINUED | OUTPATIENT
Start: 2019-01-01 | End: 2019-01-01

## 2019-01-01 RX ORDER — ACETAMINOPHEN 325 MG/1
650 TABLET ORAL EVERY 6 HOURS PRN
Status: DISCONTINUED | OUTPATIENT
Start: 2019-01-01 | End: 2019-01-01

## 2019-01-01 RX ORDER — GABAPENTIN 100 MG/1
100 CAPSULE ORAL 2 TIMES DAILY
Status: DISCONTINUED | OUTPATIENT
Start: 2019-01-01 | End: 2019-01-01

## 2019-01-01 RX ORDER — POLYETHYLENE GLYCOL 3350 17 G/17G
17 POWDER, FOR SOLUTION ORAL DAILY PRN
Qty: 10 EACH | Refills: 0 | Status: SHIPPED | OUTPATIENT
Start: 2019-01-01

## 2019-01-01 RX ORDER — HEPARIN SODIUM 5000 [USP'U]/ML
5000 INJECTION, SOLUTION INTRAVENOUS; SUBCUTANEOUS EVERY 8 HOURS SCHEDULED
Status: DISCONTINUED | OUTPATIENT
Start: 2019-01-01 | End: 2019-01-01

## 2019-01-01 RX ORDER — HYDROCODONE BITARTRATE AND ACETAMINOPHEN 5; 325 MG/1; MG/1
1 TABLET ORAL EVERY MORNING
COMMUNITY

## 2019-01-01 RX ORDER — SODIUM POLYSTYRENE SULFONATE 4.1 MEQ/G
15 POWDER, FOR SUSPENSION ORAL; RECTAL ONCE
Status: COMPLETED | OUTPATIENT
Start: 2019-01-01 | End: 2019-01-01

## 2019-01-01 RX ORDER — HYDRALAZINE HYDROCHLORIDE 20 MG/ML
10 INJECTION INTRAMUSCULAR; INTRAVENOUS EVERY 4 HOURS PRN
Status: DISCONTINUED | OUTPATIENT
Start: 2019-01-01 | End: 2019-01-01

## 2019-01-01 RX ORDER — MORPHINE SULFATE 4 MG/ML
2 INJECTION, SOLUTION INTRAMUSCULAR; INTRAVENOUS ONCE
Status: COMPLETED | OUTPATIENT
Start: 2019-01-01 | End: 2019-01-01

## 2019-01-01 RX ORDER — ALFUZOSIN HYDROCHLORIDE 10 MG/1
10 TABLET, EXTENDED RELEASE ORAL
Status: DISCONTINUED | OUTPATIENT
Start: 2019-01-01 | End: 2019-01-01

## 2019-01-01 RX ORDER — DEXTROSE MONOHYDRATE 25 G/50ML
50 INJECTION, SOLUTION INTRAVENOUS
Status: DISCONTINUED | OUTPATIENT
Start: 2019-01-01 | End: 2019-01-01

## 2019-01-01 RX ORDER — HYDROCODONE BITARTRATE AND ACETAMINOPHEN 5; 325 MG/1; MG/1
1 TABLET ORAL EVERY 4 HOURS PRN
Qty: 30 TABLET | Refills: 0 | Status: SHIPPED | OUTPATIENT
Start: 2019-01-01

## 2019-01-01 RX ORDER — FUROSEMIDE 20 MG/1
20 TABLET ORAL DAILY
Qty: 30 TABLET | Refills: 5 | Status: SHIPPED | OUTPATIENT
Start: 2019-01-01

## 2019-01-01 RX ORDER — MORPHINE SULFATE 4 MG/ML
2 INJECTION, SOLUTION INTRAMUSCULAR; INTRAVENOUS EVERY 30 MIN PRN
Status: DISCONTINUED | OUTPATIENT
Start: 2019-01-01 | End: 2019-01-01 | Stop reason: HOSPADM

## 2019-01-01 RX ORDER — MELATONIN
325 2 TIMES DAILY WITH MEALS
COMMUNITY

## 2019-01-01 RX ORDER — TORSEMIDE 20 MG/1
20 TABLET ORAL DAILY
Status: DISCONTINUED | OUTPATIENT
Start: 2019-01-01 | End: 2019-01-01

## 2019-01-01 RX ORDER — METOPROLOL SUCCINATE 25 MG/1
25 TABLET, EXTENDED RELEASE ORAL
Status: DISCONTINUED | OUTPATIENT
Start: 2019-01-01 | End: 2019-01-01

## 2019-01-01 RX ORDER — MELATONIN
325 2 TIMES DAILY WITH MEALS
Status: DISCONTINUED | OUTPATIENT
Start: 2019-01-01 | End: 2019-01-01

## 2019-01-01 RX ORDER — SODIUM CHLORIDE 9 MG/ML
125 INJECTION, SOLUTION INTRAVENOUS CONTINUOUS
Status: DISCONTINUED | OUTPATIENT
Start: 2019-01-01 | End: 2019-01-01

## 2019-01-01 RX ORDER — HYDROCODONE BITARTRATE AND ACETAMINOPHEN 5; 325 MG/1; MG/1
2 TABLET ORAL EVERY 4 HOURS PRN
Status: DISCONTINUED | OUTPATIENT
Start: 2019-01-01 | End: 2019-01-01

## 2019-01-01 RX ORDER — SODIUM POLYSTYRENE SULFONATE 4.1 MEQ/G
30 POWDER, FOR SUSPENSION ORAL; RECTAL ONCE
Status: COMPLETED | OUTPATIENT
Start: 2019-01-01 | End: 2019-01-01

## 2019-01-01 RX ORDER — ALFUZOSIN HYDROCHLORIDE 10 MG/1
10 TABLET, EXTENDED RELEASE ORAL NIGHTLY
Status: DISCONTINUED | OUTPATIENT
Start: 2019-01-01 | End: 2019-01-01

## 2019-01-01 RX ORDER — METOPROLOL SUCCINATE 25 MG/1
25 TABLET, EXTENDED RELEASE ORAL DAILY
COMMUNITY

## 2019-01-01 RX ORDER — SODIUM BICARBONATE 650 MG/1
1300 TABLET ORAL 2 TIMES DAILY
Qty: 120 TABLET | Refills: 0 | Status: SHIPPED | OUTPATIENT
Start: 2019-01-01

## 2019-01-12 NOTE — TELEPHONE ENCOUNTER
Left VM for daughter- Cr stable; lytes stable; hgb better with EPO- to repeat aranesp this month and check labs in 1 month- berthax paulo

## 2019-05-31 NOTE — TELEPHONE ENCOUNTER
Left VM for daughter with results; needs EPO; pt does not want dialysis.     Boby Naylor- please ask pt to come in for Aranesp- thx fang

## 2019-07-10 PROBLEM — N19 RENAL FAILURE, UNSPECIFIED CHRONICITY: Status: ACTIVE | Noted: 2019-01-01

## 2019-07-10 PROBLEM — M54.9 BACK PAIN WITHOUT RADIATION: Status: ACTIVE | Noted: 2019-01-01

## 2019-07-10 PROBLEM — E87.5 HYPERKALEMIA: Status: ACTIVE | Noted: 2019-01-01

## 2019-07-11 NOTE — ED PROVIDER NOTES
Patient Seen in: [facility] Emergency Department    History   Patient presents with:  Fall (musculoskeletal, neurologic)    Stated Complaint: FALL    HPI    Trevon Smith is a pleasant 26-year-old male presenting with fall.   Patient coming with complaints No      Review of Systems    Positive for stated complaint: FALL  Other systems are as noted in HPI. Constitutional and vital signs reviewed. All other systems reviewed and negative except as noted above.     Physical Exam     ED Triage Vitals [07/10/ (*)     .1 (*)     RDW-SD 52.0 (*)     Lymphocyte Absolute 0.71 (*)     All other components within normal limits   PTT, ACTIVATED - Normal   CBC WITH DIFFERENTIAL WITH PLATELET    Narrative:      The following orders were created for panel order CBC

## 2019-07-11 NOTE — OCCUPATIONAL THERAPY NOTE
OCCUPATIONAL THERAPY EVALUATION - INPATIENT     Room Number: 1393/1431-G  Evaluation Date: 7/11/2019  Type of Evaluation: Initial  Presenting Problem: back pain, renal failure    Physician Order: IP Consult to Occupational Therapy  Reason for Therapy: ADL/ Drives: No       Prior Level of Function: lives alone in apartment. Independent with ADL, except for showering. Caregiver 8 hrs/week who assist with showering, cooking, and cleaning. Legally blind.  Able to see slight peripheral, light/da SATURATIONS                ACTIVITIES OF DAILY LIVING ASSESSMENT  AM-PAC ‘6-Clicks’ Inpatient Daily Activity Short Form  How much help from another person does the patient currently need…  -   Putting on and taking off regular lower body clothing?: A Lot guidance. Patient End of Session: In bed;Needs met;Call light within reach;RN aware of session/findings; All patient questions and concerns addressed    ASSESSMENT     Patient is a 80year old male admitted on 7/10/2019 for low back pain.  Complete medica Potential : Fair  Frequency (Obs): 5x/week  Number of Visits to Meet Established Goals: 5    ADL Goals   Patient will perform grooming: with setup  Patient will perform upper body dressing:  with supervision  Patient will perform lower body dressing:  with

## 2019-07-11 NOTE — H&P
[facility] Hospitalist H&P       CC: Patient presents with:  Fall (musculoskeletal, neurologic)       PCP: Loy Reed MD    History of Present Illness: Patient is a 80year old male with PMH sig for CKD, DM, anemia, chronic low back pain and macular deg [Opioid Leonor*        Comment:Pt does not remember     Home Medications:    Outpatient Medications Marked as Taking for the 7/10/19 encounter [facility] Encounter):  mupirocin 2 % External Ointment Apply topically 2 (two) times daily.  Disp:  Rfl:    XIAO oz (87.8 kg)   SpO2 95%   BMI 28.57 kg/m²   General:  Alert, no distress, appears stated age. Head:  Normocephalic, without obvious abnormality, atraumatic. Eyes:  Sclera anicteric, No conjunctival pallor, EOMs intact.     Nose: Nares normal. Septum mid low attenuation in the periventricular and deep white matter which are nonspecific but most consistent with small vessel ischemic changes. There is no evidence of hemorrhage, mass, midline shift, or extra-axial fluid collection.   The visualized paranasal s foraminal stenosis. L4-L5:  Mild diffuse disc bulge with bilateral facet hypertrophy and ligamentum flavum thickening. Narrowing of the subarticular zones bilaterally. Moderate to severe spinal canal stenosis.   Mild to moderate bilateral neural foraminal recommendations pending patient's clinical course.   [facility] hospitalist to continue to follow patient while in house    Patient and/or patient's family given opportunity to ask questions and note understanding and agreeing with therapeutic plan as outlined

## 2019-07-11 NOTE — PLAN OF CARE
Patient alert and oriented x4. On Ra, . Patient complains of 5/10 back pain. PRN pain medication. Lidoderm patch and hot pack provided. Blood sugars maintained as per MAR. Resting comfortably in bed. Daughter updated on POC.   Madhav continue to [employer]

## 2019-07-11 NOTE — HOME CARE LIAISON
[facility] Met with patient at the bedside to discuss home health services. Pt currently declining stating that \"physical therapy is torture. \" Updated CLAIR Atkinson of patient's decision.

## 2019-07-11 NOTE — CONSULTS
[facility]  Report of Consultation    Auther Claude Patient Status:  Observation    1923 MRN [id number]   [facility] 3NE-A Attending Ashutosh Mcmahon, DO   Hosp Day # 0 PCP Vero Tolentino MD       Assessment / Plan:    1) CKD impairment    • Macular degeneration 6/8/2010    ELEVATED IOP, DR. Paola Bowling   • Muscle weakness    • OSTEOPENIA    • Renal disorder    • Visual impairment      Past Surgical History:   Procedure Laterality Date   • HIP HEMIARTHROPLASTY/ BIPOLAR Left 8/22/ 5-325 MG per tab 1 tablet, 1 tablet, Oral, Q4H PRN **OR** HYDROcodone-acetaminophen (NORCO) 5-325 MG per tab 2 tablet, 2 tablet, Oral, Q4H PRN  •  sodium bicarbonate 150 mEq in Sterile Water for Injection 1,000 mL infusion, 150 mEq, Intravenous, Continuous moving all extremities  Skin: Warm and dry, no rashes      Laboratory Data:  Lab Results   Component Value Date    WBC 4.5 07/11/2019    HGB 8.4 07/11/2019    HCT 26.4 07/11/2019    .0 07/11/2019    CREATSERUM 6.65 07/11/2019     07/11/2019

## 2019-07-11 NOTE — PHYSICAL THERAPY NOTE
PHYSICAL THERAPY EVALUATION - INPATIENT     Room Number: 5600/5638-W  Evaluation Date: 7/11/2019  Type of Evaluation: Initial  Physician Order: PT Eval and Treat    Presenting Problem: fall, intractable low back pain  Reason for Therapy: Mobility Dys part-time     Patient Owned Equipment: Rolling walker; Other (Comment)(W/C)       Prior Level of [place]: Pt lives in [facility] in [facility]. He has assistance for showers, light cleaning. He ambulates with RW.      SUBJECTIVE  Pt tangential in conversatio (AM-PAC Scale): 30.55   CMS Modifier (G-Code): CM    FUNCTIONAL ABILITY STATUS  Gait Assessment   Gait Assistance: Dependent assistance  Distance (ft): 3  Assistive Device: Rolling walker  Pattern: Shuffle     Comment : based upon FIM definitions    [employer] from skilled inpatient PT to address the above deficits to assist patient in returning to prior to level of function. Subacute rehab is recommended for 12-14 days.   After this period of rehabilitation patient should achieve supervision level in bed mobility

## 2019-07-11 NOTE — PROGRESS NOTES
Received pt from ER via transport and daughter Valeria Cotto  Pt A&Ox4 calm and cooperative  Oriented to room and unit  Admit navigator completed  Morphine 2mg ivp given upon arrival, pt c/o pain 9/10 after move to [facility] po prn   High fall precautions mainta

## 2019-07-11 NOTE — ED INITIAL ASSESSMENT (HPI)
Patient fell at home on the 4th of July. He states he fell backwards trying to  a pair of shorts and landed on his right side. He presents with a contusion to his right scalp. He denies LOC and was able to get himself up off the floor.  Does not take

## 2019-07-11 NOTE — CM/SW NOTE
07/11/19 1500   CM/SW Referral Data   Referral Source Physician;Social Work (self-referral)   Reason for Referral Discharge planning   Informant Patient; Children   Patient Info   Patient's Mental Status Alert;Oriented   Patient Communication Issues Visu

## 2019-07-12 NOTE — PLAN OF CARE
Received patient asleep in bed. AOx4. Has mac degeneration, needs help with urinal, legally blind, [place]. Wears glasses. Denies any pain at this time. Room air, clear lungs. Heparin for VTE. Electrolyte protocol. QID accuchecks, 299 tonight. Strict I&O.  Raudel Frye

## 2019-07-12 NOTE — CM/SW NOTE
10:41am  MSW called back by Pt's dtr who would like referral to [facility], if the CNA Sterling Drain is working there and can work with patient.  MSW will call [facility] to f/u on this request.   Referral sent  DON req      2:08pm   MSW spoke to dtr Amber Bonner

## 2019-07-12 NOTE — CM/SW NOTE
2:30pm  MSW met with patient, he declines [facility] and C .  MSW called and updated Dtr Vernon Turk on above

## 2019-07-12 NOTE — PROGRESS NOTES
[facility] Hospitalist Progress Note     PCP: Nicholas Daniels MD    SUBJECTIVE:  No CP, SOB, or N/V. Pt feels that his back pain is better today. Very talkative and cheerful.     OBJECTIVE:  Temp:  [97.4 °F (36.3 °C)-98.3 °F (36.8 °C)] 98.3 °F (36.8 °C)  Pu (Porcine)  5,000 Units Subcutaneous Q8H [address]   • Alfuzosin HCl ER  10 mg Oral Nightly   • Insulin Aspart Pen  1-5 Units Subcutaneous TID CC and HS       PEG 3350, ondansetron HCl, acetaminophen, HYDROcodone-acetaminophen **OR** HYDROcodone-acetaminophen, gl

## 2019-07-12 NOTE — PLAN OF CARE
Patient alert and oriented x4. Confused and forgetful at times. On RA. Mild chronic back pain. Lidoderm patch. Scheduled gabapentin. Blood sugars maintained as per MAR. Resting comfortably in bed. Will continue to monitor.

## 2019-07-12 NOTE — PROGRESS NOTES
[facility]  Nephrology Progress Note    Auther Claude Attending:  Ashutosh Mcmahon, DO       Assessment and Plan:    1) CKD 5- progressive renal failure due to long-standing hypertension, diabetes and age-related nephrosclerosis; there may be an under nerves grossly intact, moving all extremities  Skin: Warm and dry, no rashes       Labs:   Lab Results   Component Value Date    WBC 4.8 07/12/2019    HGB 8.4 07/12/2019    HCT 25.5 07/12/2019    .0 07/12/2019    CREATSERUM 6.83 07/12/2019    BUN 12

## 2019-07-12 NOTE — CM/SW NOTE
PAM discussed with patient about getting more help at home since he declined [facility]. He states he will talk to his caregiver Lizett Waldrop who comes on Thursday about more help.  He also says a lady in the office at his building will call his dtr bryanna with the nam

## 2019-07-12 NOTE — CM/SW NOTE
Called by nurse to provide assistance with confirming discharge plan.   Reached out by phone with daughter Peng Reddy who stated 'pt doing this on his own.'  Spoke with patient for over 15 minutes--patient aware of 'attempt' to get [facility] to [facility]

## 2019-07-13 NOTE — PROGRESS NOTES
[facility] Hospitalist Progress Note     PCP: Dakotah Solis MD    SUBJECTIVE:  No CP, SOB, or N/V. Back pain controlled.   Lengthy conversation with pt regarding need to go to rehab, he knows he has to but would not agree to it \"hates the idea of it\" patch Transdermal Daily   • sodium bicarbonate  1,300 mg Oral BID   • gabapentin  100 mg Oral BID   • Heparin Sodium (Porcine)  5,000 Units Subcutaneous Q8H [facility]   • Alfuzosin HCl ER  10 mg Oral Nightly   • Insulin Aspart Pen  1-5 Units Subcutaneous TID CC

## 2019-07-13 NOTE — CM/SW NOTE
CM met with patient to discuss D/C plan. Pt states he does not want to go to [facility]. Pt is interested in care giver services which he has not looked in to. Spoke with daughter Valeria Cotto .   Valeria Cotto states she cannot take the time to set up car

## 2019-07-13 NOTE — SLP NOTE
ADULT SWALLOWING EVALUATION    ASSESSMENT    ASSESSMENT/OVERALL IMPRESSION:  Order for BSE received to r/o aspiration due to choking episode on breakfast meal this AM. Chart reviewed. Pt admitted 7/10/19 with c/o back pain after sustaining a fall.  Brain an without radiation  Active Problems:    Renal failure, unspecified chronicity    Hyperkalemia      Past Medical History  Past Medical History:   Diagnosis Date   • ANEMIA    • Back problem    • Blind    • DIABETES    • Diabetes ([facility] [address]    • Family hx of colon Goals: 1  Follow Up Needed: Yes  SLP Follow-up Date: 07/15/19    Thank you for your referral.   If you have any questions, please contact Chay Campbell 87 CCC-SLP/L, pager [phone]  Speech-LanguagePathologist

## 2019-07-13 NOTE — PROGRESS NOTES
[facility]  Nephrology Progress Note    Dayron Mckeon Attending:  Bettie Espinal,        Assessment and Plan:    1) CKD 5- progressive renal failure due to long-standing hypertension, diabetes and age-related nephrosclerosis; there may be an under organomegaly  Extremities: Without clubbing, cyanosis; mild LE edema  Neurologic: Cranial nerves grossly intact, moving all extremities  Skin: Warm and dry, no rashes       Labs:   Lab Results   Component Value Date    PGLU 130 07/13/2019       Imaging:  A

## 2019-07-13 NOTE — PLAN OF CARE
Problem: Impaired Swallowing  Goal: Minimize aspiration risk  Description  Interventions:  - Please cut/chop up food for patient prior to meals  - Encourage Pt to take small bites at a time  - Pt should maintain upright position for all meals     Outcome

## 2019-07-13 NOTE — PLAN OF CARE
Assumed care at [address]. Pt is A&O 4, legally blind, pt states \"I can see better in my peripheral vision\". On Ra, . No tele. Denies pain. Voids in urinal, incontinent at times.    Ambulates using cane at home, but is a x2 assist.   Accuchecks QI

## 2019-07-13 NOTE — PLAN OF CARE
Assumed care of patient at [address] [place] [address]. Patient A&Ox4, hard of hearing. Legally blind, states periphery is better vision. On Ra. No telemetry. Saline locked, dressing changed C/D/I. Patient max 2 assist with pivoting to chair.  Discussions with patient about s

## 2019-07-14 NOTE — PROGRESS NOTES
[facility]  Nephrology Progress Note    Lillian Sandoval Attending:  Sudha West, DO       Assessment and Plan:    1) CKD 5- progressive renal failure due to long-standing hypertension, diabetes and age-related nephrosclerosis; there may be an under

## 2019-07-14 NOTE — PHYSICAL THERAPY NOTE
PHYSICAL THERAPY TREATMENT NOTE - INPATIENT    Room Number: 9960/5179-R     Session: 1   Number of Visits to Meet Established Goals: 6    Presenting Problem: fall, intractable low back pain    Problem List  Principal Problem:    Back pain without radiatio BASIC MOBILITY  How much difficulty does the patient currently have. ..  -   Turning over in bed (including adjusting bedclothes, sheets and blankets)?: A Little   -   Sitting down on and standing up from a chair with arms (e.g., wheelchair, bedside commode Sub-acute rehabilitation     PLAN  PT Treatment Plan: Bed mobility; Body mechanics; Endurance; Energy conservation;Patient education; Family education;Gait training;Transfer training  Rehab Potential : Fair  Frequency (Obs): 5x/week    CURRENT GOALS     Goal #

## 2019-07-14 NOTE — CM/SW NOTE
07/14/19 1200   Discharge disposition   Expected discharge disposition Skilled Nurs   Name of Facillity/Home Care/Hospice [facility]   Discharge transportation [address] pt is now in agreement to Ector Miller at [facility].   TH would like dc

## 2019-07-14 NOTE — PROGRESS NOTES
NURSING DISCHARGE NOTE    Discharged Rehab facility via Ambulance. Accompanied by Support staff  Belongings Taken by patient/family. AVS and follow up given and reviewed with patient, verbalized understanding.

## 2019-07-14 NOTE — PLAN OF CARE
Patient is A&Ox4. Legally blind and [place]. No hearing aids used. VSS, afebrile. Not telemetry monitored. On RA. BLE edema present. C/o mild lower back pain, Lidocaine patch applied. Aspiration precautions, no difficulty swallowing observed.   Ambul

## 2019-07-14 NOTE — PROGRESS NOTES
[facility] Hospitalist Progress Note     PCP: Kimberly Barton MD    SUBJECTIVE:  No CP, SOB, or N/V. Pain is controlled. Finally agreeable to going to [facility].       OBJECTIVE:  Temp:  [98.2 °F (36.8 °C)-98.5 °F (36.9 °C)] 98.3 °F (36.8 °C)  Pulse:  [ mg Oral BID   • Heparin Sodium (Porcine)  5,000 Units Subcutaneous Q8H [facility]   • Alfuzosin HCl ER  10 mg Oral Nightly   • Insulin Aspart Pen  1-5 Units Subcutaneous TID CC and HS       PEG 3350, ondansetron HCl, acetaminophen, HYDROcodone-acetaminophen **OR*

## 2019-07-14 NOTE — PLAN OF CARE
Resumed care of pt. At 1. Pt. Is Ax4, calm, cooperative, and very pleasant. Daily weight    Renal diet/Accu:QID/food must be cut up into small pieces due to pt. Legally blind and having a choking episode during the day.     RA, no Telemetry  Continent-

## 2019-07-17 NOTE — PROGRESS NOTES
Auther Claude  : 1923  Age 80year old  male patient is admitted to Facility: [facility].     [address] [place] [address] Drive date:  7/10/19  Discharge date to [facility]:  19  ELOS:  12-14 days  Anticipated dischar FOR PAIN MANAGEMENT   • LUMBAR EPIDURAL N/A 5/11/2016    Performed by Juan Chisholm MD at [address]   • OTHER SURGICAL HISTORY  8/22/13    Left hip hemiarthroplasty by Dr. Puneet Cardona     Family History   Problem Relation Age of Onset 1000 units Oral Cap Take 1 tablet by mouth daily. Disp: 90 capsule Rfl: 3   hydrocortisone 2.5 % External Cream Apply 1 Application topically 2 (two) times daily as needed.  Per Dr. Yoshi Lira Disp:  Rfl:    Heat Wraps [facility] BACK/HIP) Does not apply Mis denies bruising, denies excessive bleeding  ENDOCRINE: denies excessive thirst or urination; denies unexpected wt gain or wt loss  ALLERGY/IMM.: denies food or seasonal allergies      PHYSICAL EXAM:  GENERAL HEALTH: well developed, well nourished, in no ap reviewed. Medication reconciliation completed.         SEE PLAN BELOW  Mechanical Fall/S/P LESI/Lumbar Stenosis/Chronic lbp/DDD-L4-L5/Muscle Weakness  -PT/OT to eval and treat with modalitis  -Tylenol 650 mg q6h prn for fever/pain, if given for fever, noti

## 2019-07-20 NOTE — PROGRESS NOTES
Gildardo Ross, 7/16/1923, 80year old, male    Chief Complaint:  Patient presents with:   Follow - Up: Mechanical Fall       Subjective:  81 y/o male with PMHx of chronic back problems, spinal stenosis, anemia, CKD-Stage 5-no dialysis, per Dr. Onofre Cap R/T recent hospitalization/diagnoses/sequelae; will undergo therapies to rehab and improve strength, endurance and independence w/ ADLs  EXTREMITIES/VASCULAR:---no cyanosis, no clubbing,+kiki le edema-chronic  NEUROLOGIC: intact; no sensorimotor deficit, re

## 2019-07-27 NOTE — PROGRESS NOTES
Idalmis Romero, 7/16/1923, 80year old, male is being discharged from Facility: [address] [place] [address]    Date of Admission: 7/14/19    Date of Anticipated Discharge:7/28/19                                 Admitting Diagnoses: Mechanical Fall 96%.  CARDIOVASCULAR: S1, S2 normal, RRR; no S3, no S4; , no click, no murmur  ABDOMEN:  normal active BS+, soft, nondistended; no organomegaly, no masses; no bruits; nontender, no guarding, no rebound tenderness.   :Deferred  LYMPHATIC:---kiki le-Compress management:    Mechanical Fall/S/P LESI/Lumbar Stenosis/Chronic lbp/DDD-L4-L5/Muscle Weakness  -Tylenol 650 mg q6h prn for fever/pain, if given for fever, notify MD/NP  -Norco 5/325 mg q4h prn  -Lidocaine Patch 12h on and 12h off   -Gabapentin 100 mg bid

## 2019-08-08 NOTE — TELEPHONE ENCOUNTER
Daughter called. Pt is living at [facility]. [facility] RN called daughter & said pt gained 5 lbs overnight. They are giving him an extra diuretic for 3 days. Daughter wants to know if this is the right thing to do?

## 2019-08-09 NOTE — TELEPHONE ENCOUNTER
D/w daughter Bonny Dakins- OK to take extra diuretic as per SNF due to 5# weight gain- mulugeta bates

## 2019-08-15 NOTE — TELEPHONE ENCOUNTER
Patient's daughter called stating patient has now moved into [facility]. She states they are saying his creatinine has gone up and patient either needs dialysis or to go on hospice. Daughter has questions and would like you to call back.

## 2019-08-20 NOTE — TELEPHONE ENCOUNTER
D/w daughter- pt to continue monthly aranesp in our office and check labs at [facility]; pt still pretty alert, etc per daughter- mulugeta Mosquera

## 2019-08-21 NOTE — TELEPHONE ENCOUNTER
I spoke with Krissy Barton, AM Supervisor at [facility] and explained that we cannot administer Aranesp in the office since pt has moved into the skilled nursing section of [facility]. Mary will notify transport not to bring pt to office this morning.    I also

## 2019-08-27 NOTE — TELEPHONE ENCOUNTER
Received call from outside lab with critical value. BUN   105. State tests were ordered by Dr. Catarina Guevara. Requested they fax all lab results.     Previous results:  8/15/19  BUN  105  8/13/19  BUN  103    Perfect Serve message sent to Dr. Catarina Guevara

## 2019-09-13 PROBLEM — E87.0 HYPERNATREMIA: Status: ACTIVE | Noted: 2019-01-01

## 2019-09-13 PROBLEM — N17.9 ACUTE RENAL FAILURE, UNSPECIFIED ACUTE RENAL FAILURE TYPE (HCC): Status: ACTIVE | Noted: 2019-01-01

## 2019-09-13 PROBLEM — N17.9 ACUTE RENAL FAILURE (HCC): Status: ACTIVE | Noted: 2019-01-01

## 2019-09-13 NOTE — ED PROVIDER NOTES
Patient Seen in: [facility] Emergency Department    History   Patient presents with:  Abnormal Result (metabolic, cardiac)  Dyspnea MALOU SOB (respiratory)    Stated Complaint: elevated BUN Creat, not eating, low sao2    HPI    Reji Dereck is a pleasant 9 HPI.  Constitutional and vital signs reviewed. All other systems reviewed and negative except as noted above.     Physical Exam     ED Triage Vitals [09/13/19 1756]   BP (!) 171/75   Pulse 73   Resp 17   Temp 97.6 °F (36.4 °C)   Temp src Temporal   SpO panel order CBC WITH DIFFERENTIAL WITH PLATELET.   Procedure                               Abnormality         Status                     ---------                               -----------         ------                     CBC W/ DIFFERENTIAL[[id number]] specified.       Medications Prescribed:  Current Discharge Medication List              Present on Admission  Date Reviewed: 5/13/2018          ICD-10-CM Noted POA    Acute renal failure ([facility] [address] N17.9 9/13/2019 Unknown

## 2019-09-14 NOTE — ED NOTES
Spoke with daughter Arleth Mike and discussed patient status.  She is in [place] but wants to be updated as needed

## 2019-09-14 NOTE — CONSULTS
[facility]  Report of Consultation    Teresa Jc Patient Status:  Inpatient    1923 MRN [id number]   [facility] 5NW-A Attending [facility] Day # 1 PCP Jasvir De Dios MD       Assessment / Plan:    1) CKD • Back problem    • Blind    • Constipation    • DIABETES    • Diabetes [facility])    • Diverticulosis of large intestine    • Edema    • Family hx of colon cancer 6/8/2010    DAUGHTER (RECTOL)   • Glaucoma    • Hearing impairment    • Macular degeneration 6/ Metoprolol Succinate ER (Toprol XL) 24 hr tab 25 mg, 25 mg, Oral, Daily Beta Blocker  •  sodium bicarbonate tab 1,300 mg, 1,300 mg, Oral, BID  •  Alfuzosin HCl ER (UROXATRAL) 24 hr tab 10 mg, 10 mg, Oral, Daily with breakfast  •  0.9% NaCl infusion, , Intr the bases bilaterally.    Abdomen: Soft, non-tender. + bowel sounds, no palpable organomegaly  Extremities: Without clubbing, cyanosis; 2+ LE edema (chronic)  Neurologic: Cranial nerves grossly intact, moving all extremities  Skin: Warm and dry, no rashes

## 2019-09-14 NOTE — PROGRESS NOTES
NURSING DISCHARGE NOTE    Discharged Rehab facility via Ambulance. Accompanied by Support staff  Belongings Taken by patient/family. IV discontinued. Discharge instructions given to patient. New orders from Dr. Kristen Case faxed to [facility].  Report c

## 2019-09-14 NOTE — PLAN OF CARE
Problem: renal failure  Data: Patient alert and oriented overnight, on 2L O2 per NC to maintain saturation >90%, patient noted to have some shortness of breath when turning in bed. Patient denies pain, sometimes incontinent, tele SB, vital signs stable.   A frequently for physical needs  - Identify cognitive and physical deficits and behaviors that affect risk of falls.   - [place] fall precautions as indicated by assessment.  - Educate pt/family on patient safety including physical limitations  - Instruct p

## 2019-09-14 NOTE — PROGRESS NOTES
NURSING ADMISSION NOTE      Patient admitted via Cart from ER. Oriented to room 513. Safety precautions initiated. Bed in low position. Call light in reach.   Patient admitted alert and oriented from ER, on 2L O2 per NC maintaining saturation 92-95%

## 2019-09-14 NOTE — CM/SW NOTE
MSW met with rn, provided form for ambulance. Ambulance arranged , rn will talk to family. Kayla May will accept and knows about new home 02 needs.       Kayla May   [phone]

## 2019-09-14 NOTE — H&P
[facility] HOSPITAL[facility]  History and Physical     Lillian Shoulder Patient Status:  Inpatient    1923 MRN [id number]   [facility] 5NW-A Attending Jennifer Tolentino MD   Hosp Day # 1 PCP Vito Forrest MD     Chief Complaint: JAVAD    History of Heart Disorder Brother         Allergies:    Ace Inhibitors          HYPERKALEMIA    Comment:Elevated creatinine & potassium  Codeine [Opioid Leonor*        Comment:Pt does not remember    Medications:    No current facility-administered medications on file pr [facility] JEANNIE BACK/HIP) Does not apply Misc Apply 1 patch topically daily as needed. Apply to the lumbar area. Disp: 3 each Rfl: 3   triamcinolone acetonide (KENALOG) 0.1 % Apply Externally Cream Apply 1 Application topically 2 (two) times daily.  Apply to loyd Imaging: Imaging data reviewed in Epic. ASSESSMENT / PLAN:     1. Acute kidney injury- on CKD 5  1. Hold nephrotoxic agents  2. IVF  3. UA  4. US kidney   5. Renal consult   2. Hyperkalemia   1. ECG  2. kayexalate  3. BMP in am  3.  Metabolic aci

## 2019-09-16 NOTE — PROGRESS NOTES
Ishan Au Author: Jacobo Verduzco MD     1923 MRN [id number]   [facility]  Admission 19      Last Hospital Discharge 19 PCP Binh Jamesluis angel of Discharge  [facility]        CC --re admitted to  [facility], [facility] re PAIN MANAGEMENT   • OTHER SURGICAL HISTORY  8/22/13    Left hip hemiarthroplasty by Dr. Alvino Putnam   • TOTAL HIP REPLACEMENT       Family History   Problem Relation Age of Onset   • Cancer Father    • Cancer Mother    • Cancer Brother    • Heart Disorder Bro mouth every 4 (four) hours as needed. Disp: 30 tablet Rfl: 0   PEG 3350 Oral Powd Pack Take 17 g by mouth daily as needed.  Disp: 10 each Rfl: 0   TAMSULOSIN HCL 0.4 MG Oral Cap TAKE 1 CAPSULE (0.4 MG TOTAL) BY MOUTH DAILY ABOUT 1/2 HOUR AFTER DINNER Disp: RRR without murmur  GI: good BS's, no masses, HSM or tenderness  : deferred  RECTAL: deferred  MUSCULOSKELETAL: back is not tender, FROM of the back  EXTREMITIES:  Bilateral lower extremity edema  NEURO: Oriented times three, cranial nerves are intact, m

## 2019-09-18 NOTE — DISCHARGE SUMMARY
[facility]  DISCHARGE SUMMARY     Kelvin Hemphill Patient Status:  Inpatient    1923 MRN [id number]   [facility] 5NW-A Attending No att. providers found   Hosp Day # 1 PCP Neelam Travis MD     Date of Admission: 2019  Date medications      Instructions Prescription details   patiromer 8.4 g Pack  Commonly known as:  VELTASSA      Take 1 packet by mouth daily.    Stop taking on:  10/14/2019  Quantity:  30 packet  Refills:  0        CONTINUE taking these medications      Instru bicarbonate 650 MG Tabs      Take 2 tablets (1,300 mg total) by mouth 2 (two) times daily.    Quantity:  120 tablet  Refills:  0     tamsulosin HCl 0.4 MG Caps  Commonly known as:  FLOMAX      TAKE 1 CAPSULE (0.4 MG TOTAL) BY MOUTH DAILY ABOUT 1/2 HOUR AFTE Elevator to [address]. Vital signs:       Physical Exam:    General: No acute distress. Respiratory: Clear to auscultation bilaterally. No wheezes. No rhonchi. Cardiovascular: S1, S2. Regular rate and rhythm.  No murmurs, rubs or kelly

## 2019-10-03 ENCOUNTER — MED REC SCAN ONLY (OUTPATIENT)
Dept: FAMILY MEDICINE CLINIC | Facility: CLINIC | Age: 84
End: 2019-10-03

## 2019-10-03 ENCOUNTER — TELEPHONE (OUTPATIENT)
Dept: FAMILY MEDICINE CLINIC | Facility: CLINIC | Age: 84
End: 2019-10-03

## 2019-10-07 ENCOUNTER — TELEPHONE (OUTPATIENT)
Dept: FAMILY MEDICINE CLINIC | Facility: CLINIC | Age: 84
End: 2019-10-07

## 2020-01-14 NOTE — DISCHARGE SUMMARY
General Medicine Discharge Summary     Patient ID:  Carlie Blanco  80year old  7/16/1923    Admit date: 7/10/2019    Discharge date and time: 7/14/2019  5:30 PM     Attending Physician: Maryjane Hunt accuchecks     HTN, LE edema  -holding lasix pending renal       Consults: None    Operative Procedures:        Patient instructions:      Discharge Medication List as of 7/14/2019  3:41 PM    START taking these medications    acetaminophen 325 MG Oral Tab daily, Historical    COD LIVER OIL OR CAPS  1 tablet dailyHistorical, Oral    Econazole Nitrate 1 % External Cream  1 application 2 times daily, Normal, Disp-30 g, R-4    metRONIDAZOLE (METROCREAM) 0.75 % External Cream  Apply 1 Application topically daily

## (undated) NOTE — IP AVS SNAPSHOT
[address]            (For Outpatient Use Only) Initial Admit Date: 7/10/2019   Inpt/Obs Admit Date: Inpt: 7/10/19 / Obs: N/A   Discharge Date:    Roxanneen Raul:  [de-identified]   MRN: [de-identified]   CSN: [id number]   CEID: [id number] Subscriber Name:  Chase Horne :    Subscriber ID:  Pt Rel to Subscriber:    Hospital Account Financial Class: Medicare    2019

## (undated) NOTE — IP AVS SNAPSHOT
Patient Demographics     Address  [address] [place] [address] Phone  [phone] [facility]) *Preferred*  [phone] (Work)  [phone] [facility]) E-mail Address  [email or web address]      Emergency Contact(s)     Name Relation Home Wo HYDROcodone-acetaminophen 5-325 MG Tabs  Commonly known as:  NORCO      Take 1 tablet by mouth every 4 (four) hours as needed. Tala Woodson DO         ICY HOT 5 % Ptch  Generic drug:  Menthol      Apply 1 patch topically daily.  Apply to L hip ar [id number] ferrous sulfate EC tab 325 mg 09/14/19 0946 Given      [id number] gabapentin (NEURONTIN) cap 100 mg 09/14/19 0946 Given      [id number] patiromer (VELTASSA) 8.4 g packet 1 packet 09/14/19 1128 Given      [id number] sodium bicarbonate injection 50 m  7 - 18 mg/dL [facility]   Creatinine 8.25 0.70 - 1.30 mg/dL [facility]   BUN/CREA Ratio 13.0 10.0 - 20.0 — [facility]   Calcium, Total 7.6 8.5 - 10.1 mg/dL  [facility]   Calculated Osmolality 344 275 - 295 mOsm/kg [facility]   GFR, Non-Elvi Ordering provider:  Samuel Moreland MD  09/13/19 1752 Resulting lab:  [facility]    Specimen Information    Type Source Collected On   Urine — 09/13/19 1909          Components    Component Value Reference Range [facility]   Urine Color Straw Yellow — Amy Blood — 09/13/19 1805          Components    Component Value Reference Range [facility]   Glucose 85 70 - 99 mg/dL — [facility]   Sodium 147 136 - 145 mmol/L [facility]   Potassium 6.1 3.5 - 5.1 mmol/L [facility]   Chloride 116 98 - 112 mmol/L Eze Frias person who is consuming high dose of biotin supplements resulting in biotin serum concentrations >100 ng/mL.  It is recommended that physicians ask all patients who may be on biotin supplementation to stop biotin consumption at least 72 hours prior to colle BUN, Cr, K per repots. Per pt he is doing well and he has not problems with his kidney. No CP/SOB/abd pain/dysuria reported. [NB.2]     Past Medical History:  Past Medical History:   Diagnosis Date   • ANEMIA    • Back problem    • Blind    • Constipation ferrous sulfate 325 (65 FE) MG Oral Tab EC Take 325 mg by mouth 2 (two) times daily with meals. Disp:  Rfl:    HYDROcodone-acetaminophen 5-325 MG Oral Tab Take 1 tablet by mouth every morning.  Disp:  Rfl:    Menthol (ICY HOT) 5 % External Patch Apply 1 pat VITEYES AREDS ADVANCED OR 1 tablet by mouth twice daily Disp:  Rfl:    COD LIVER OIL OR CAPS 1 tablet daily Disp:  Rfl:        Review of Systems:   A comprehensive 14 point review of systems was completed.     Pertinent positives and negatives noted in the 3. Metabolic acidosis due to #1  1. Cont home sodium bicarb tabs  4. Anemia of chronic disease   1. monitor   5. Lymphedema in legs- hold diuretic   1. Echo 2016- preserved EF with grade 1 diastolic dysfunction   6. DM 2  1. Hyperglycemia protocol   7.  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  Previous evaluation for other etiologies was unrevealing.  No structural abnormalities noted. Edema is chronic / stable. Modestly uremic.  PLAN- no further w/u; no dialysis- have d Past Surgical History:   Procedure Laterality Date   • HIP HEMIARTHROPLASTY/ BIPOLAR Left 8/22/2013    Performed by Jerel Gamez MD at [facility] MAIN OR   • LUMBAR EPIDURAL N/A 6/26/2019    Performed by Barbara Colon MD at [address] [place] [address] •  ondansetron HCl (ZOFRAN) injection 4 mg, 4 mg, Intravenous, Q6H PRN  •  hydrALAzine HCl (APRESOLINE) injection 10 mg, 10 mg, Intravenous, Q4H PRN  •  glucose (DEX4) oral liquid 15 g, 15 g, Oral, Q15 Min PRN **OR** Glucose-Vitamin C (DEX-4) chewable tab HCT 25.6 09/13/2019    .0 09/13/2019    CREATSERUM 8.25 09/14/2019     09/14/2019     09/14/2019    K 5.1 09/14/2019     09/14/2019    CO2 22.0 09/14/2019     09/14/2019    CA 7.6 09/14/2019    ALB 3.0 09/13/2019    ALKPHO

## (undated) NOTE — IP AVS SNAPSHOT
[address]            (For Outpatient Use Only) Initial Admit Date: 9/13/2019   Inpt/Obs Admit Date: Inpt: 9/13/19 / Obs: N/A   Discharge Date:    Christine Chaudhari:  [de-identified]   MRN: [de-identified]   CSN: [id number]   CEID: [id number] Subscriber Name:  [facility] [address] :    Subscriber ID:  Pt Rel to Subscriber:    Hospital Account Financial Class: Medicare    2019

## (undated) NOTE — IP AVS SNAPSHOT
Patient Demographics     Address  [address] [place] [address] Phone  [phone] (Home) *Preferred*  [phone] [facility]) E-mail Address  [email or web address]      Emergency Contact(s)     Name Relation Home Work Waleska Sauceda Paulino          Ketoconazole 2 % Sham  Commonly known as:  NIZORAL      Apply topically. As directed          lidocaine-menthol 4-1 % Ptch      Place 1 patch onto the skin daily.   Stop taking on:  8/12/2019   Venancio Mills DO         metRONIDAZOLE 0.75 % Cr [id number] Alfuzosin HCl ER (UROXATRAL) 24 hr tab 10 mg 07/13/19 2013 Given      [id number] PEG 3350 (MIRALAX) powder packet 17 g 07/14/19 1241 Given      [id number] gabapentin (NEURONTIN) cap 100 mg 07/13/19 2013 Given      [id number] gabapentin (NEURONTIN) [facility] Hospitalist H&P       CC: Patient presents with:  Fall (musculoskeletal, neurologic)       PCP: Elke Reed MD    History of Present Illness: Patient is a 80year old male with PMH sig for CKD, DM, anemia, chronic low back pain and macular degen Comment:Elevated creatinine & potassium  Codeine [Opioid Leonor*        Comment:Pt does not remember     Home Medications:    Outpatient Medications Marked as Taking for the 7/10/19 encounter [facility] HOSPITAL Encounter):  mupirocin 2 % External Ointment Apply topi °C) (Oral)   Resp 22   Ht 175.3 cm (5' 9\")   Wt 193 lb 8 oz (87.8 kg)   SpO2 95%   BMI 28.57 kg/m²   General:  Alert, no distress, appears stated age. Head:  Normocephalic, without obvious abnormality, atraumatic.    Eyes:  Sclera anicteric, No conjuncti atrophy with symmetric prominence of the ventricles. There are patchy areas of low attenuation in the periventricular and deep white matter which are nonspecific but most consistent with small vessel ischemic changes.  There is no evidence of hemorrhage, ma hypertrophy and ligamentum flavum thickening. Narrowing of the subarticular zones bilaterally. Mild bilateral neural foraminal stenosis. L4-L5:  Mild diffuse disc bulge with bilateral facet hypertrophy and ligamentum flavum thickening.   Narrowing of the Dispo -[KM. 1] pt not ready for dc yet. May need palliative care depending on renal discussions[KM. 3]    Outpatient records reviewed confirming patient's medical history and medications. Further recommendations pending patient's clinical course.   KAUR h preserved EF; DC IVF; resume lower dose diuretics on discharge    3) Back pain- due to lumbar disc disease / arthritis- agree with gabapentin     4) Chronic metabolic acidosis- due to CKD; increase PO sodium bicarb to 1300 mg bid     5) Anemia- due to CKD; • Cancer Mother    • Cancer Brother    • Heart Disorder Brother      Denies family history of kidney disease. reports that he quit smoking about 49 years ago. His smoking use included cigarettes. He has a 7.50 pack-year smoking history.  He has never use •  Insulin Aspart Pen (NOVOLOG) 100 UNIT/ML flexpen 1-5 Units, 1-5 Units, Subcutaneous, TID CC and HS    No current outpatient medications on file. Review of Systems:  Please see HPI for pertinent positives.  10 point review of systems otherwise reviewed Thank you for allowing me to participate in the care of your patient. Jen-Federico Weaver  7/11/2019  10:05 AM[HF.1]      Electronically signed by Romelia Sy MD on 7/11/2019 10:10 AM   Attribution Key    HF. 1 - Romelia Sy MD on 7/11/2019 10:05 AM solid were self administered. Oral phase and pharyngeal phases of swallow unremarkable. No overt clinical s/s aspiration observed or suspected at this time. Given Pt low vision status, suspect Pt not paying attention to bolus size.  Educated Pt and reinforc Strength: Within Functional Limits     Range of Motion: Within Functional Limits       Voice Quality: Clear  Respiratory Status: Unlabored  Consistencies Trialed:  Thin liquids;Puree;Hard solid  Method of Presentation: Self presentation  Patient Positioning